# Patient Record
Sex: MALE | Race: WHITE | NOT HISPANIC OR LATINO | URBAN - METROPOLITAN AREA
[De-identification: names, ages, dates, MRNs, and addresses within clinical notes are randomized per-mention and may not be internally consistent; named-entity substitution may affect disease eponyms.]

---

## 2024-05-16 ENCOUNTER — DOCUMENTATION (OUTPATIENT)
Dept: SURGERY | Facility: HOSPITAL | Age: 59
End: 2024-05-16
Payer: OTHER GOVERNMENT

## 2024-05-29 ENCOUNTER — DOCUMENTATION (OUTPATIENT)
Dept: SURGERY | Facility: HOSPITAL | Age: 59
End: 2024-05-29
Payer: OTHER GOVERNMENT

## 2024-06-04 ENCOUNTER — DOCUMENTATION (OUTPATIENT)
Dept: SURGERY | Facility: HOSPITAL | Age: 59
End: 2024-06-04
Payer: OTHER GOVERNMENT

## 2024-06-10 ENCOUNTER — DOCUMENTATION (OUTPATIENT)
Dept: SURGERY | Facility: HOSPITAL | Age: 59
End: 2024-06-10
Payer: OTHER GOVERNMENT

## 2024-06-10 NOTE — PROGRESS NOTES
[facility] called to check if pt had scheduled any visits. I called her back and left a vcml to advise, 3 attempts made and no contact, referral closed and pt is not currently receiving services  at this time.

## 2024-06-17 ENCOUNTER — TELEPHONE (OUTPATIENT)
Dept: SURGERY | Facility: CLINIC | Age: 59
End: 2024-06-17
Payer: OTHER GOVERNMENT

## 2024-06-17 NOTE — TELEPHONE ENCOUNTER
[facility] call made to [facility] (returning call to Miss Peguero at ext [phone].  Told her that patient has not returned out calls and we attempted 3x's.

## 2024-06-18 ENCOUNTER — DOCUMENTATION (OUTPATIENT)
Dept: SURGERY | Facility: HOSPITAL | Age: 59
End: 2024-06-18
Payer: OTHER GOVERNMENT

## 2024-06-18 ENCOUNTER — TELEPHONE (OUTPATIENT)
Dept: SURGERY | Facility: CLINIC | Age: 59
End: 2024-06-18
Payer: OTHER GOVERNMENT

## 2024-06-18 NOTE — PROGRESS NOTES
Received a staff message from Arabella SNYDER to advise of pt's contact number to call to schedule. I called the [phone]  and received a vcml. A message was left.

## 2024-06-18 NOTE — TELEPHONE ENCOUNTER
Ms Peguero from the [facility] called and stated that this patient has 2 different cell phone numbers. Asked if we can call him on  [phone]

## 2024-06-28 NOTE — PROGRESS NOTES
"Subjective     Date: 6/28/2024 Time: 3:14 PM  Name: Darrell Hawkins  MRN: [id number]    This is a 59 y.o. male with morbid obesity (Body mass index is 58.51 kg/m².) who presents to clinic for consideration of bariatric surgery with hiatal hernia repair. he has attempted and failed multiple diet and exercise regimens for weight loss. Initial Onset of obesity was  after discharge from [employer] in 1999.  .  Their goal for surgery is to  be healthier , lose weight, and get hiatal hernia repair . The patient has tried multiple diets to lose weight including  \"does not diet\" cut out pop, drinking more water, more physically active . The patient was most successful with the  physically active job and drinking a lot of water . The most pounds lost on this diet were 70/80 lbs. The patient considers their dietary weakness to be  snacking due to boredom.  The patient reports a  highest weight ever of 415 pounds and lowest weight ever of 190/200 pounds Distribution of Obesity: is central. The patient does not exercise     Comorbidities: joint paindoes not take NSAIDs and sleep apnea using an appliance    [facility] Hi[facility] and + H. Pylori finished treatment (pt states he will be retested 7/16, and will bring EGD op-report to appt or send via [employer] prior if able)     SLEEVE Gastric Surgery For Morbid Obesity Laparoscopic Longitudinal Gastrectomy  Sleeve ? Does not want bypass     0 = No symptoms    PMH: No past medical history on file.     PSH: No past surgical history on file.       Denies personal/ + family hx of VTE (Mother- DVT)     FAMILY HISTORY:  No family history on file.     SOCIAL HISTORY:       MEDICATIONS:  Prior to Admission Medications:  Medication Documentation Review Audit    **Prior to Admission medications have not yet been reviewed**          ALLERGIES:  Not on File    REVIEW OF SYSTEMS:  GENERAL: Negative for malaise, significant weight loss and fever  HEAD: Negative for headache, swelling.  NECK: " Negative for lumps, goiter, pain and significant neck swelling  RESPIRATORY: Negative for cough, wheezing or shortness of breath.  CARDIOVASCULAR: Negative for chest pain, leg swelling or palpitations.  GI: Negative for abdominal discomfort, blood in stools or black stools or change in bowel habits  : No history of dysuria, frequency or incontinence  MUSCULOSKELETAL: Negative for joint pain or swelling, back pain or muscle pain.  SKIN: Negative for lesions, rash, and itching.  PSYCH: Negative for sleep disturbance, mood disorder and recent psychosocial stressors.  ENDOCRINE: Negative for cold or heat intolerance, polyuria, polydipsia and goiter.    Objective   PHYSICAL EXAM:  There were no vitals taken for this visit.  General appearance: obese, NAD  Neuro: AOx3  Head: EOMI; no swelling or lesions of scalp or face  ENT:  no lumps or lymphadenopathy, thyroid normal to palpation; oropharynx clear, no swelling or erythema  Skin: warm, no erythema or rashes  Lungs: clear to percussion and auscultation  Heart: regular rhythm and S1, S2 normal  Abdomen: soft, non-tender, no masses, no organomegaly  Extremities: Normal exam of the extremities. No swelling or pain.  Psych: no hurried speech, no flight of ideas, normal affect    Assessment/Plan   IMPRESSION:  Darrell Hawkins is a 59 y.o. male with a BMI of Body mass index is 58.51 kg/m². with the following diagnoses and co-morbidities:     [facility], has been evaluated by [facility] program and recommended for bariatric surgery. Patient eported to ave large paraesophageal hiatal hernia, was seen in [facility] and they did not offer him surgery. Unclear reasons.   He resumed smoking after he was denied surgery, willing to quit again.     This patient does meet the criteria for a surgical weight loss procedure according to NIH guidelines.    The risks of sleeve gastrectomy, Marco-en-Y gastric bypass including but not limited to bleeding, leak along staple lines, infection,  dehydration, ulcers, internal hernia, DVT/PE, pneumonia, myocardial infarction, prolonged nausea/vomiting, incomplete resolution of associated medical conditions, reflux, weight regain, vitamin/mineral deficiencies, and death have been explained to the patient and Darrell Hawkins has expressed understanding and acceptance of them.     We discussed the lifestyle changes necessary to be successful following surgery.    Does not want gastric bypass and wants SG only which is ok option as long as he understands post SG GERD risk and possible need for GBP in future which we discussed and he agrees. If he truly has large para then we will do SG and convert it into type I hiatal hernia if proper reduction is not possible.     The increased risk of substance and alcohol abuse following bariatric surgery was discussed with the patient, along with the negative consequences of substance/alcohol use after surgery including addiction, worsening of mental health disorders, and injury to the stomach. The risk of smoking and vaping (tobacco or any other substance) after bariatric surgery was explained to the patient. This includes risk of anastamotic ulcers, gastritis, bleeding, perforation, stricture, and PO intolerance.  The patient expressed understanding and acceptance of these risks.      The benefits of the above surgeries including weight loss, improvement/resolution of associated medical and mental health conditions, improved mobility, and decreased mortality have been explained the the patient and Darrell Hawkins has expressed understanding and acceptance of them.      PLAN:  The plan of treatment for Darrell Hawkins is to continue with the consultations and tests ordered today in hopes of qualifying for pre-operative clearance for bariatric surgery. This includes:    CT abd/pelvis to assess hernia details  Consult Nutrition for education  [facility]  [facility]  [facility] Pul[facility]ology [facility]  Labs  ordered  EGD report  PCP for medical optimization  CPAP compliance report   Recommend at least 10 lbs of weight loss prior to surgery.

## 2024-07-01 ENCOUNTER — NUTRITION (OUTPATIENT)
Dept: SURGERY | Facility: HOSPITAL | Age: 59
End: 2024-07-01
Payer: OTHER GOVERNMENT

## 2024-07-01 DIAGNOSIS — Z71.3 ENCOUNTER FOR NUTRITION EVALUATION PRIOR TO BARIATRIC SURGERY: ICD-10-CM

## 2024-07-01 DIAGNOSIS — Z98.84 BARIATRIC SURGERY STATUS: Primary | ICD-10-CM

## 2024-07-01 NOTE — PROGRESS NOTES
Surgeon: Laurie  Patient is considering:  sleeve gastrectomy    ASSESSMENT:  Current weight:  n/a lbs   Ht: 68-69  in   BMI: N/A       Initial start weight: n/a lbs      Food allergies/intolerances:  NKFA  Chewing/Swallowing/Dentition: denies    Nausea / Vomiting / Hx Gastroparesis:  denies  Diarrhea/ Constipation: denies  Exercise level:  none, SOB easily   Smoking/Tobacco use: currently smokes cigarette 1/2-1 PPD   Vitamins/Minerals supplements:  vitamin D3  Medications:   see list  Past diet attempts:   exercise  Hours of sleep/night: 6    24 HOUR RECALL/DIET HISTORY:  Breakfast:  x2 slices toast  Snack:    Lunch: nothing  Snack:   Dinner: chicken and squash  Snack: slice of bread   Beverages: 12 oz 2% milk, 120oz water, 36 oz coffee   Alcohol: once in awhile     Person responsible for cooking & shopping?  Self and brother   Grocery stores frequented:  [employer] and [employer]    Grocery trips per week/month: once/week  Food Insecurity: Low  How often do you eat sweet snacks? none  How often do you eat savory snacks?  none  How often do you eat out?  Once/week   Do you feel overly stuffed?  Denies  Binge Eating? Denies, but IDs with eating more rapid than normal  Night Eating?  Denies  Emotional Eating?  Triggered by boredom, will opt for a sandwich       READINESS TO LEARN:  Motivation to learn: Interested        Understanding of instruction: Good    Anticipated Compliance: Good        Family Support: brother is supportive    Educational Materials Provided:    Plate Method  High Protein Snack List  High Protein Drink  High Protein food list  Schedules for support group   Goals sheet     Patient will scheduled to attend a Virtual Education Class to review the 2 week Pre-op diet, Post op fluid, protein, vitamin/mineral supplementation, exercise goals and post op diet progression.    Patient presents with excessive calorie obesity seeking sleeve gastrectomy.    Patient seen today to complete nutrition evaluation for weight  "loss surgery. Patient's weight was not available today.   Reports he was 367# 3 months ago. Patient is interested in surgery to be able to repair his Hiatal Hernia. States he needs to lsoe weight first. Patient reports not much awareness of WLS. Reports he's only exercised in the past to help with weight loss. Used to exercise at a gym \"years\" ago and recently lost his job and less activity from that. Patient reports he drinks mostly water and coffee. Does not typically drink with meals. Reports he may only eat 2 meals/day, has not eaten lunch for many years.     Recommended to begin to eat 3 meals/day, avoid skipping meals. Reviewed balanced meals by using plate method. Encouraged to eat protein rich foods at each meal, balanced with fiber rich foods. Reviewed fluids to eliminate and replace with SF, non-carbonated, caffeine free fluids. Reviewed postop behaviors and encouraged to begin practicing. Recommended to start daily MVI. Recommended to begin exercising  for 3 days/week for 10 minutes. Follow up in 2 months.        Malnutrition Screening:  Significant unintentional weight loss? No  Eating less than 75% of usual intake for more than 2 weeks? No      Nutrition Diagnosis:   1. Overweight/obesity related to excess energy intake as evidenced by BMI = 40 kg/m^2.  2. Food- and nutrition-related knowledge deficit related to lack of prior exposure to surgical weight loss information as evidenced by pt new to surgical program.    Nutrition Interventions:   1. Modify type and amount of food and nutrients within meals and snacks.  2. Comprehensive Nutrition Education    Recommendations:  1. Structure meal patterns, eating three meals and 1-2 snacks per day.   Here is what your day could look like:   Breakfast: 2 slices wheat toast, 1 egg, 1 slice cheese, 1oz deli ham (breakfast sandwich)   Lunch: Protein shake with 1/2 banana   Dinner: 3-5 ounces (palm of your hand) grilled chicken breast, 1/2 cup sweet potato, 1 cup " steamed green beans.     2. Build meals around protein rich foods. Aim for 3-4 ounces (20-30 grams) protein per meal. Lean proteins include chicken, turkey, fish, lean cuts of beef and 90% lean ground beef, pork, shrimp, low fat dairy products, and eggs.   3. Fill half your plate with non-starchy vegetables. Select high fiber starches like  sweet potatoes, peas, beans, lentils, quinoa, whole wheat breads and pastas, and brown rice. Keep portion of starches to 1/4 plate (1/2 cup - 1 cup per meal).  4. Drink 64oz of calorie-free, caffeine-free, and non-carbonated beverages.   5. Practice no drinking 30 minutes before meals, nothing with meals and wait 30 minutes after meals to drink again. Make meals last 30 minutes-chew thoroughly.   6. Begin daily multivitamin.  No gummies. Centrum adult complete, equate kids chewable, or Women's One a Day are all acceptable options.  7. Start walking 10 minutes every other day OR check out free videos on Youtube to exercise at home. Start with low intervals, 10-15 minutes, as tolerated.   8. We will follow up with your progress on 8/29/24 at 9:00 AM. You will need to weigh yourself before this appointment and provide a 24 hour food recall.    Pre-op Goal weight: lose 5% of body weight    Nutrition Monitoring and Evaluation: 1-2 pound weight loss per week  Criteria: weight check  Need for Follow-up:     Patient does meet National Institutes Health guidelines for weight loss surgery, however needs to demonstrate consistent effort in making dietary changes before giving clearance. It is anticipated that the patient will need at least  1-2   nutritional follow-up visits prior to clearance for surgery.

## 2024-07-08 ENCOUNTER — TELEPHONE (OUTPATIENT)
Dept: SURGERY | Facility: CLINIC | Age: 59
End: 2024-07-08
Payer: OTHER GOVERNMENT

## 2024-07-08 PROBLEM — E66.01 OBESITY, CLASS III, BMI 40-49.9 (MORBID OBESITY) (MULTI): Status: ACTIVE | Noted: 2019-04-18

## 2024-07-08 PROBLEM — K44.9 PARAESOPHAGEAL HERNIA: Status: ACTIVE | Noted: 2019-04-19

## 2024-07-08 PROBLEM — E66.813 OBESITY, CLASS III, BMI 40-49.9 (MORBID OBESITY): Status: ACTIVE | Noted: 2019-04-18

## 2024-07-08 PROBLEM — A04.8 H. PYLORI INFECTION: Status: ACTIVE | Noted: 2024-07-08

## 2024-07-08 PROBLEM — G47.33 OSA TREATED WITH BIPAP: Status: ACTIVE | Noted: 2019-04-21

## 2024-07-08 NOTE — TELEPHONE ENCOUNTER
Attempted to call patient to complete NPV intake. No answer. Left message asking patient to please call me back to complete to NPV intake via telephone or can complete it via the [employer] message I sent on 6/28 prior to appt with Dr. Sullivan on 7/16. My direct # [phone].

## 2024-07-08 NOTE — ASSESSMENT & PLAN NOTE
Positive on EGD in April, patient states he completed treatment and [facility].

## 2024-07-16 ENCOUNTER — DOCUMENTATION (OUTPATIENT)
Dept: SURGERY | Facility: HOSPITAL | Age: 59
End: 2024-07-16

## 2024-07-16 ENCOUNTER — OFFICE VISIT (OUTPATIENT)
Dept: SURGERY | Facility: HOSPITAL | Age: 59
End: 2024-07-16
Payer: OTHER GOVERNMENT

## 2024-07-16 VITALS
BODY MASS INDEX: 47.74 KG/M2 | WEIGHT: 315 LBS | DIASTOLIC BLOOD PRESSURE: 74 MMHG | HEIGHT: 68 IN | SYSTOLIC BLOOD PRESSURE: 145 MMHG

## 2024-07-16 DIAGNOSIS — Z71.3 PRE-BARIATRIC SURGERY NUTRITION EVALUATION: ICD-10-CM

## 2024-07-16 DIAGNOSIS — E66.01 CLASS 3 SEVERE OBESITY DUE TO EXCESS CALORIES WITH SERIOUS COMORBIDITY AND BODY MASS INDEX (BMI) OF 50.0 TO 59.9 IN ADULT (MULTI): Primary | ICD-10-CM

## 2024-07-16 DIAGNOSIS — Z13.21 ENCOUNTER FOR VITAMIN DEFICIENCY SCREENING: ICD-10-CM

## 2024-07-16 DIAGNOSIS — K44.9 HIATAL HERNIA: ICD-10-CM

## 2024-07-16 DIAGNOSIS — Z98.84 BARIATRIC SURGERY STATUS: ICD-10-CM

## 2024-07-16 PROCEDURE — 3008F BODY MASS INDEX DOCD: CPT | Performed by: SURGERY

## 2024-07-16 PROCEDURE — 99215 OFFICE O/P EST HI 40 MIN: CPT | Performed by: SURGERY

## 2024-07-16 PROCEDURE — 99205 OFFICE O/P NEW HI 60 MIN: CPT | Performed by: SURGERY

## 2024-07-17 ENCOUNTER — DOCUMENTATION (OUTPATIENT)
Dept: SURGERY | Facility: HOSPITAL | Age: 59
End: 2024-07-17
Payer: OTHER GOVERNMENT

## 2024-07-17 NOTE — PROGRESS NOTES
[facility] updated [facility] from [facility]. Uploaded, and [place] advised. Discrepancies noted, H-Pylori breath test not done, but records shows patient had positive H-Pylori result from path. Mentions controlled, but no mention identified of treatment regimen.  Mali will review and advise.

## 2024-07-17 NOTE — PROGRESS NOTES
E-mail sent to Marilyn at [facility] for an updated record for an updated record request. JUDITH rosadoied.

## 2024-07-18 NOTE — PROGRESS NOTES
E-mail correspondence was sent to [facility]ilyn at [facility]. New records were received and will be reviewed by [place] WONG.

## 2024-07-19 ENCOUNTER — TELEPHONE (OUTPATIENT)
Dept: SURGERY | Facility: HOSPITAL | Age: 59
End: 2024-07-19
Payer: OTHER GOVERNMENT

## 2024-07-19 NOTE — TELEPHONE ENCOUNTER
[facility]ed [facility]. EGD pathology with +Mikael. Per [facility] contact patient did not have follow up breath test. Telephone call placed to patient to patient to follow up. Unable to reach at this time. Left voice message requesting return call.

## 2024-08-02 ENCOUNTER — PATIENT MESSAGE (OUTPATIENT)
Dept: SURGERY | Facility: CLINIC | Age: 59
End: 2024-08-02
Payer: OTHER GOVERNMENT

## 2025-07-31 ENCOUNTER — PATIENT MESSAGE (OUTPATIENT)
Dept: SURGERY | Facility: HOSPITAL | Age: 60
End: 2025-07-31
Payer: OTHER GOVERNMENT

## 2025-08-27 ENCOUNTER — PATIENT MESSAGE (OUTPATIENT)
Dept: SURGERY | Facility: HOSPITAL | Age: 60
End: 2025-08-27
Payer: OTHER GOVERNMENT